# Patient Record
Sex: FEMALE | Race: WHITE | Employment: OTHER | URBAN - METROPOLITAN AREA
[De-identification: names, ages, dates, MRNs, and addresses within clinical notes are randomized per-mention and may not be internally consistent; named-entity substitution may affect disease eponyms.]

---

## 2023-07-13 ENCOUNTER — PROCEDURE VISIT (OUTPATIENT)
Dept: CARDIOLOGY CLINIC | Age: 73
End: 2023-07-13

## 2023-07-13 DIAGNOSIS — I42.8 NICM (NONISCHEMIC CARDIOMYOPATHY) (HCC): ICD-10-CM

## 2023-07-13 LAB
LV EF: 58 %
LVEF MODALITY: NORMAL

## 2023-10-26 ENCOUNTER — OFFICE VISIT (OUTPATIENT)
Dept: CARDIOLOGY CLINIC | Age: 73
End: 2023-10-26
Payer: MEDICARE

## 2023-10-26 ENCOUNTER — TELEPHONE (OUTPATIENT)
Dept: CARDIOLOGY CLINIC | Age: 73
End: 2023-10-26

## 2023-10-26 VITALS
HEIGHT: 64 IN | BODY MASS INDEX: 23.39 KG/M2 | OXYGEN SATURATION: 98 % | WEIGHT: 137 LBS | SYSTOLIC BLOOD PRESSURE: 130 MMHG | DIASTOLIC BLOOD PRESSURE: 86 MMHG | HEART RATE: 49 BPM

## 2023-10-26 DIAGNOSIS — I42.8 NICM (NONISCHEMIC CARDIOMYOPATHY) (HCC): Primary | ICD-10-CM

## 2023-10-26 DIAGNOSIS — I25.10 CORONARY ARTERY DISEASE INVOLVING NATIVE CORONARY ARTERY OF NATIVE HEART WITHOUT ANGINA PECTORIS: ICD-10-CM

## 2023-10-26 DIAGNOSIS — I10 ESSENTIAL HYPERTENSION: ICD-10-CM

## 2023-10-26 DIAGNOSIS — R00.1 SINUS BRADYCARDIA: ICD-10-CM

## 2023-10-26 DIAGNOSIS — I44.7 LEFT BUNDLE BRANCH BLOCK (LBBB): ICD-10-CM

## 2023-10-26 DIAGNOSIS — R00.2 HEART PALPITATIONS: ICD-10-CM

## 2023-10-26 PROCEDURE — 3079F DIAST BP 80-89 MM HG: CPT | Performed by: INTERNAL MEDICINE

## 2023-10-26 PROCEDURE — 1123F ACP DISCUSS/DSCN MKR DOCD: CPT | Performed by: INTERNAL MEDICINE

## 2023-10-26 PROCEDURE — 3075F SYST BP GE 130 - 139MM HG: CPT | Performed by: INTERNAL MEDICINE

## 2023-10-26 PROCEDURE — 99214 OFFICE O/P EST MOD 30 MIN: CPT | Performed by: INTERNAL MEDICINE

## 2023-10-26 RX ORDER — ROSUVASTATIN CALCIUM 5 MG/1
5 TABLET, COATED ORAL
COMMUNITY
Start: 2023-10-21

## 2023-10-26 RX ORDER — PANTOPRAZOLE SODIUM 40 MG/1
TABLET, DELAYED RELEASE ORAL
COMMUNITY
Start: 2023-10-14

## 2023-10-26 NOTE — PROGRESS NOTES
[facility]      Cardiology Consult    Vadim Britt  1950    October 26, 2023    Referring Physician: Faby Santos MD  Reason for Referral: Cardiomyopathy     CC: \"I was in the hospital and my heart rate was slow. \"     HPI:  The patient is 72 y.o. female with a past medical history significant for hypertension and nonischemic cardiomyopathy who presents today for management of heart failure. She previously followed with cardiology in [place] after her echocardiogram showed a reduced EF 15% in 2016. She had a normal angiogram in 2016. The says she was diagnosed with a stress induced CM as the EF improved. She presented to [facility] with chest pains and admitted with an NSTEMI in 6/2022. She underwent angiography with no intervention. Her echocardiogram again showed a reduced at 35-40%. Her repeat echocardiogram 7/13/23 revealed an improved EF of 55-60%. Today, she presents for follow-up after hospitalization at [facility]. She is aware that she is typically bradycardic and AV sonja blockers at this in the past.  The night of her admission, she states she was feeling unwell and having night sweats and noticed her pulse was lower than normal for her and then had episodes where it was \"fast.\"  She denies associated lightheadedness or syncope. She was observed overnight in the hospital and evaluated by cardiology. She states they discussed pacemaker therapy but patient was reluctant. She denies any recurrent episodes of tachycardia or profound bradycardia and generally has been feeling at her baseline for the past few weeks at home. She denies any chest pains or worsening shortness of breath. She reports compliance with her medications. She states she was unable to tolerate statins in the past with reports of myalgias. She says that she is \"sensitive\" to many medications but is willing to try other therapies. She denies any abnormal bleeding or bruising.  Patient

## 2023-10-26 NOTE — TELEPHONE ENCOUNTER
Monitor placed by - Esther Kovacs, [address]  Length of monitor - 30 days  Monitor ordered by - Dr Shayy Rice  Serial number -[id number]  Mauricio Maciel  Activation successful prior to pt leaving office?  Yes       ORDER SCANNED IN CHART

## 2023-10-30 ENCOUNTER — PATIENT MESSAGE (OUTPATIENT)
Dept: CARDIOLOGY CLINIC | Age: 73
End: 2023-10-30

## 2023-10-30 RX ORDER — SACUBITRIL AND VALSARTAN 24; 26 MG/1; MG/1
1 TABLET, FILM COATED ORAL 2 TIMES DAILY
Qty: 60 TABLET | Refills: 3 | Status: SHIPPED | OUTPATIENT
Start: 2023-10-30

## 2023-10-30 NOTE — TELEPHONE ENCOUNTER
Last OV: 10/26/23  Next OV: X due in 6 months  Last refill: 6/15/23  #60  3 R/F  Most recent Labs:   Last EKG (if needed): 6/15/23

## 2023-10-30 NOTE — TELEPHONE ENCOUNTER
From: Zion Greer  To: Dr. Rosario Carlson: 10/30/2023 4:19 PM EDT  Subject: Refill    I am needing a refill on my Entresto at [facility] [address] in [facility].     Thank you,    Phuong Bellamy

## 2023-11-27 ENCOUNTER — TELEPHONE (OUTPATIENT)
Dept: CARDIOLOGY CLINIC | Age: 73
End: 2023-11-27

## 2023-11-27 NOTE — TELEPHONE ENCOUNTER
Dr. Wesly Matta reviewed monitor results, \"Baseline rhythm is sinus bradycardia. No reported symptoms. Plan: average HR was 57 but no significant blocks or pauses. No indication for a pacemaker at this time. Continue to monitor. \"     Please call patient with results. Advise to call the office with any new symptoms.

## 2023-12-01 ENCOUNTER — TELEPHONE (OUTPATIENT)
Dept: CARDIOLOGY CLINIC | Age: 73
End: 2023-12-01

## 2023-12-01 DIAGNOSIS — I25.119 ATHEROSCLEROSIS OF NATIVE CORONARY ARTERY WITH ANGINA PECTORIS, UNSPECIFIED WHETHER NATIVE OR TRANSPLANTED HEART (HCC): Primary | ICD-10-CM

## 2024-02-19 RX ORDER — SACUBITRIL AND VALSARTAN 24; 26 MG/1; MG/1
1 TABLET, FILM COATED ORAL 2 TIMES DAILY
Qty: 180 TABLET | Refills: 3 | Status: SHIPPED | OUTPATIENT
Start: 2024-02-19

## 2024-08-07 NOTE — PROGRESS NOTES
habits.  Genitourinary: No dysuria, trouble voiding, or hematuria.  Musculoskeletal:  No gait disturbance, no joint complaints.  Integumentary: No rash or pruritis.  Neurological: No headache, diplopia, change in muscle strength, numbness or tingling.   Psychiatric: No anxiety or depression.  Endocrine: No temperature intolerance. No excessive thirst, fluid intake, or urination. No tremor.  Hematologic/Lymphatic: No abnormal bruising or bleeding, blood clots or swollen lymph nodes.  Allergic/Immunologic: No nasal congestion or hives.    Physical Exam:   /72   Pulse 56   Ht 1.626 m (5' 4\")   Wt 63 kg (139 lb)   SpO2 97%   BMI 23.86 kg/m²   Wt Readings from Last 3 Encounters:   08/08/24 63 kg (139 lb)   10/26/23 62.1 kg (137 lb)   06/15/23 60.3 kg (133 lb)     Constitutional: She is oriented to person, place, and time. She appears well-developed and well-nourished. In no acute distress.   Head: Normocephalic and atraumatic. Pupils equal and round.  Neck: Neck supple. No JVP or carotid bruit appreciated. No mass and no thyromegaly present. No lymphadenopathy present.  Cardiovascular: Bradycardic but regular. Normal heart sounds. Exam reveals no gallop and no friction rub. No murmur heard.  Pulmonary/Chest: Effort normal and breath sounds normal. No respiratory distress. She has no wheezes, rhonchi or rales.   Abdominal: Soft, non-tender. Bowel sounds are normal. She exhibits no organomegaly, mass or bruit.   Extremities: No edema. No cyanosis or clubbing. Pulses are 2+ radial/carotid bilaterally.  Neurological: No gross cranial nerve deficit. Coordination normal.   Skin: Skin is warm and dry. There is no rash or diaphoresis.   Psychiatric: She has a normal mood and affect. Her speech is normal and behavior is normal.     Lab Review:   FLP:  No results found for: \"TRIG\", \"HDL\", \"LDLDIRECT\"  BUN/Creatinine:  No results found for: \"BUN\", \"CREATININE\"    EKG Interpretation: 6/15/23 Marked sinus bradycardia. Left

## 2024-08-08 ENCOUNTER — OFFICE VISIT (OUTPATIENT)
Dept: CARDIOLOGY CLINIC | Age: 74
End: 2024-08-08
Payer: MEDICARE

## 2024-08-08 VITALS
BODY MASS INDEX: 23.73 KG/M2 | HEART RATE: 56 BPM | SYSTOLIC BLOOD PRESSURE: 118 MMHG | WEIGHT: 139 LBS | HEIGHT: 64 IN | OXYGEN SATURATION: 97 % | DIASTOLIC BLOOD PRESSURE: 72 MMHG

## 2024-08-08 DIAGNOSIS — I25.10 CORONARY ARTERY DISEASE INVOLVING NATIVE CORONARY ARTERY OF NATIVE HEART WITHOUT ANGINA PECTORIS: ICD-10-CM

## 2024-08-08 DIAGNOSIS — R00.1 SINUS BRADYCARDIA: ICD-10-CM

## 2024-08-08 DIAGNOSIS — I42.8 NICM (NONISCHEMIC CARDIOMYOPATHY) (HCC): Primary | ICD-10-CM

## 2024-08-08 DIAGNOSIS — I44.7 LEFT BUNDLE BRANCH BLOCK (LBBB): ICD-10-CM

## 2024-08-08 PROCEDURE — G8427 DOCREV CUR MEDS BY ELIG CLIN: HCPCS | Performed by: INTERNAL MEDICINE

## 2024-08-08 PROCEDURE — 1036F TOBACCO NON-USER: CPT | Performed by: INTERNAL MEDICINE

## 2024-08-08 PROCEDURE — G8420 CALC BMI NORM PARAMETERS: HCPCS | Performed by: INTERNAL MEDICINE

## 2024-08-08 PROCEDURE — 93000 ELECTROCARDIOGRAM COMPLETE: CPT | Performed by: INTERNAL MEDICINE

## 2024-08-08 PROCEDURE — G8400 PT W/DXA NO RESULTS DOC: HCPCS | Performed by: INTERNAL MEDICINE

## 2024-08-08 PROCEDURE — 3017F COLORECTAL CA SCREEN DOC REV: CPT | Performed by: INTERNAL MEDICINE

## 2024-08-08 PROCEDURE — 1123F ACP DISCUSS/DSCN MKR DOCD: CPT | Performed by: INTERNAL MEDICINE

## 2024-08-08 PROCEDURE — 1090F PRES/ABSN URINE INCON ASSESS: CPT | Performed by: INTERNAL MEDICINE

## 2024-08-08 PROCEDURE — 99214 OFFICE O/P EST MOD 30 MIN: CPT | Performed by: INTERNAL MEDICINE

## 2024-08-08 RX ORDER — BRINZOLAMIDE 10 MG/ML
1 SUSPENSION/ DROPS OPHTHALMIC 2 TIMES DAILY
COMMUNITY
Start: 2024-07-25

## 2025-02-12 RX ORDER — SACUBITRIL AND VALSARTAN 24; 26 MG/1; MG/1
1 TABLET, FILM COATED ORAL 2 TIMES DAILY
Qty: 180 TABLET | Refills: 3 | Status: SHIPPED | OUTPATIENT
Start: 2025-02-12

## 2025-02-12 NOTE — TELEPHONE ENCOUNTER
Last OV: 8/8/24  Next OV: X due yearly  Last refill: 2/19/24 #180 3 R/F  Most recent Labs: 1/3/25  Last EKG (if needed): 8/8/25